# Patient Record
Sex: FEMALE | Race: WHITE | URBAN - METROPOLITAN AREA
[De-identification: names, ages, dates, MRNs, and addresses within clinical notes are randomized per-mention and may not be internally consistent; named-entity substitution may affect disease eponyms.]

---

## 2018-03-27 ENCOUNTER — HISTORICAL (OUTPATIENT)
Dept: ADMINISTRATIVE | Facility: HOSPITAL | Age: 63
End: 2018-03-27

## 2018-04-02 LAB
INFLUENZA A ANTIGEN, POC: NEGATIVE
INFLUENZA B ANTIGEN, POC: NEGATIVE

## 2022-04-12 ENCOUNTER — HISTORICAL (OUTPATIENT)
Dept: ADMINISTRATIVE | Facility: HOSPITAL | Age: 67
End: 2022-04-12

## 2022-04-30 VITALS
DIASTOLIC BLOOD PRESSURE: 76 MMHG | BODY MASS INDEX: 23.49 KG/M2 | OXYGEN SATURATION: 98 % | SYSTOLIC BLOOD PRESSURE: 152 MMHG | HEIGHT: 67 IN | WEIGHT: 149.69 LBS

## 2022-05-05 NOTE — HISTORICAL [facility] CERNER
This is a historical note converted from [place]. Formatting and pictures may have been removed.  Please reference [place] for original formatting and attached multimedia. Chief Complaint  left wrist and left knee. no injury, wrist pain for about a month and knee pain for a couple of months  History of Present Illness  This 62-year-old comes in complaining of left wrist pain.? I performed open reduction and internal fixation of her left distal radius in 2013.? She was doing well until she started new exercises.? She hit the base of her wrist and has pain since that time.? She is also complaining of some weakness in her wrist and hand.? She is also complaining of left knee pain.? She is accompanied by her .  Review of Systems  Constitutional: No fever, weakness, or fatigue.  Ear/Nose/Mouth/Throat: No nasal congestion or sore throat.  Respiratory: No shortness of breath or cough.  Cardiovascular: No chest pain, palpitations, or peripheral edema.  Gastrointestinal: No nausea, vomiting, or abdominal pain.  Genitourinary: No dysuria.  Musculoskeletal: See current complaints  Integumentary: Negative.  Physical Exam  Vitals & Measurements  HR:?56(Peripheral)? BP:?145/76?  HT:?168.99?cm? HT:?168.99?cm? WT:?69.39?kg? WT:?69.39?kg? BMI:?24.3?  Physical examination of the left wrist shows that her wrist healed uneventfully.? Dorsiflexion is 75? and palmar flexion is 85?.? Supination is 90? and pronation is 90?.? She is thin and has palpable hardware at her distal radius.? Examination of her thumb shows that she has mild CMC arthritis.? She is also noted to have extensive intrinsic atrophy with weakness in key pinch on the left.? She has mild intrinsic atrophy on the right.? She has decreased  strength.? She has a positive Tinels on the mid forearm to the wrist on the left, in the ulnar nerve distribution.  ?  Examination of her left knee shows that the patient has quadriceps insertional tendinitis.? Range of motion is  0-120?. ?She has good patella tracking and no evidence of ligamentous laxity.? She has no intra-articular effusion.  ?  AP lateral and oblique of the left wrist shows the patients fracture healed anatomically and her hardware position is unchanged.  Assessment/Plan  1.?Left wrist pain  ? The findings were reviewed with the patient and she expressed understanding.? The patient?is considering hardware removal.? We also discussed getting nerve conduction studies to rule out ulnar nerve entrapment.? As far as her knee is concerned she has done well with topical anti-inflammatories and stretching.? She states that she will call if she needs anything further.? She requested no pain medication.  Ordered:  Office/Outpatient Visit Level 3 Established [id number] PC, Left wrist pain  Lesion of left ulnar nerve  Strain of left quadriceps tendon  Painful orthopaedic hardware, MD AMB - [facility], 03/27/18 11:28:00 CDT  XR Wrist Left Minimum 3 Views, Routine, 03/27/18 8:36:00 CDT, Pain, None, Stretcher, Patient Has IV?, Rad Type, Left wrist pain, Not Scheduled, 03/27/18 8:36:00 CDT  ?  2.?Lesion of left ulnar nerve  Ordered:  Office/Outpatient Visit Level 3 Established [id number] PC, Left wrist pain  Lesion of left ulnar nerve  Strain of left quadriceps tendon  Painful orthopaedic hardware, [facility] - [facility], 03/27/18 11:28:00 CDT  ?  3.?Strain of left quadriceps tendon  Ordered:  Office/Outpatient Visit Level 3 Established [id number] PC, Left wrist pain  Lesion of left ulnar nerve  Strain of left quadriceps tendon  Painful orthopaedic hardware, [facility] AM[facility] - [facility], 03/27/18 11:28:00 CDT  ?  4.?Painful orthopaedic hardware  Ordered:  Office/Outpatient Visit Level 3 Established [id number] PC, Left wrist pain  Lesion of left ulnar nerve  Strain of left quadriceps tendon  Painful orthopaedic hardware, [facility] - [facility], 03/27/18 11:28:00 CDT  ?  Orders:  Clinic Follow-up PRN, 03/27/18 11:28:00 CDT, Future Order, [facility]  [place]   Problem List/Past Medical History  Ongoing  Left wrist pain  Lesion of left ulnar nerve  Painful orthopaedic hardware  Strain of left quadriceps tendon  Historical  can lie flat  Claustrophobia  Colon polyp  fx left radius left rib  numbness/tingling buttocks d/t fall  Wears glasses  Procedure/Surgical History  Open reduction of fracture of radius and ulna with internal fixation (10/10/2013), Open treatment of distal radial extra-articular fracture or epiphyseal separation, with internal fixation. (10/10/2013), ORIF Radius (Left) (10/10/2013), Appendectomy, Arthroscope, Arthroscope, Colonoscopy, rt ankle ganglion cyst reml, Tonsillectomy and adenoidectomy, uterine ablation 2003.  Medications  Vitamin C 500 mg oral capsule, 500 mg= 1 cap(s), Oral, Daily  Vitamin D3 1000 intl units oral capsule, 1000 IntUnit= 1 cap(s), Oral, Daily  Vitamin K 5 mg Oral Tab, 2 tab(s), Oral, Once  Allergies  Demerol?(rash)  Valium?(pt reports  eyes opened big and contacts came out, fetal position with legs straight, head went back)  morphine?(rash)  numbing med used at dentist?(chest tightness, someone sitting on chest)  Social History  Alcohol - Denies Alcohol Use, 10/09/2013  Current, 10/10/2013  Employment/School  Retired, Highest education level: Post graduate degree(s)., 10/09/2013  Substance Abuse - Denies Substance Abuse, 10/09/2013  Tobacco  Past, Cigarettes, 10/09/2013

## 2022-09-22 ENCOUNTER — HISTORICAL (OUTPATIENT)
Dept: ADMINISTRATIVE | Facility: HOSPITAL | Age: 67
End: 2022-09-22

## 2024-06-13 ENCOUNTER — CLINICAL SUPPORT (OUTPATIENT)
Dept: RADIATION THERAPY | Facility: HOSPITAL | Age: 69
End: 2024-06-13
Attending: RADIOLOGY
Payer: MEDICARE

## 2024-06-13 PROCEDURE — 77386 HC IMRT, COMPLEX: CPT | Performed by: RADIOLOGY

## 2024-06-14 PROCEDURE — 77386 HC IMRT, COMPLEX: CPT | Performed by: RADIOLOGY

## 2024-06-17 PROCEDURE — 77336 RADIATION PHYSICS CONSULT: CPT | Performed by: RADIOLOGY

## 2024-06-17 PROCEDURE — 77386 HC IMRT, COMPLEX: CPT | Performed by: RADIOLOGY

## 2024-06-18 PROCEDURE — 77386 HC IMRT, COMPLEX: CPT | Performed by: RADIOLOGY

## 2024-06-19 PROCEDURE — 77386 HC IMRT, COMPLEX: CPT | Performed by: RADIOLOGY

## 2024-06-20 PROCEDURE — 77386 HC IMRT, COMPLEX: CPT | Performed by: RADIOLOGY

## 2024-06-21 PROCEDURE — 77386 HC IMRT, COMPLEX: CPT | Performed by: RADIOLOGY

## 2024-06-24 PROCEDURE — 77386 HC IMRT, COMPLEX: CPT | Performed by: RADIOLOGY

## 2024-06-24 PROCEDURE — 77336 RADIATION PHYSICS CONSULT: CPT | Performed by: RADIOLOGY

## 2024-06-25 PROCEDURE — 77386 HC IMRT, COMPLEX: CPT | Performed by: RADIOLOGY

## 2024-06-26 PROCEDURE — 77386 HC IMRT, COMPLEX: CPT | Performed by: RADIOLOGY

## 2024-06-27 PROCEDURE — 77386 HC IMRT, COMPLEX: CPT | Performed by: RADIOLOGY

## 2024-06-28 PROCEDURE — 77386 HC IMRT, COMPLEX: CPT | Performed by: RADIOLOGY

## 2024-07-01 ENCOUNTER — CLINICAL SUPPORT (OUTPATIENT)
Dept: RADIATION THERAPY | Facility: HOSPITAL | Age: 69
End: 2024-07-01
Attending: RADIOLOGY
Payer: MEDICARE

## 2024-07-01 PROCEDURE — 77336 RADIATION PHYSICS CONSULT: CPT | Performed by: RADIOLOGY

## 2024-07-01 PROCEDURE — 77386 HC IMRT, COMPLEX: CPT | Performed by: RADIOLOGY

## 2024-07-02 PROCEDURE — 77386 HC IMRT, COMPLEX: CPT | Performed by: RADIOLOGY

## 2024-07-03 PROCEDURE — 77386 HC IMRT, COMPLEX: CPT | Performed by: RADIOLOGY

## 2024-07-05 PROCEDURE — 77386 HC IMRT, COMPLEX: CPT | Performed by: RADIOLOGY

## 2024-07-08 PROCEDURE — 77336 RADIATION PHYSICS CONSULT: CPT | Performed by: RADIOLOGY

## 2024-07-08 PROCEDURE — 77386 HC IMRT, COMPLEX: CPT | Performed by: RADIOLOGY

## 2024-07-09 PROCEDURE — 77386 HC IMRT, COMPLEX: CPT | Performed by: RADIOLOGY

## 2024-07-10 PROCEDURE — 77386 HC IMRT, COMPLEX: CPT | Performed by: RADIOLOGY

## 2024-07-11 PROCEDURE — 77386 HC IMRT, COMPLEX: CPT | Performed by: RADIOLOGY

## 2024-07-12 PROCEDURE — 77386 HC IMRT, COMPLEX: CPT | Performed by: RADIOLOGY

## 2024-07-15 PROCEDURE — 77386 HC IMRT, COMPLEX: CPT | Performed by: RADIOLOGY

## 2024-07-15 PROCEDURE — 77336 RADIATION PHYSICS CONSULT: CPT | Performed by: RADIOLOGY

## 2024-07-16 PROCEDURE — 77386 HC IMRT, COMPLEX: CPT | Performed by: RADIOLOGY

## 2024-07-16 PROCEDURE — 77338 DESIGN MLC DEVICE FOR IMRT: CPT | Performed by: RADIOLOGY

## 2024-07-16 PROCEDURE — 77301 RADIOTHERAPY DOSE PLAN IMRT: CPT | Mod: 59 | Performed by: RADIOLOGY

## 2024-07-16 PROCEDURE — 77300 RADIATION THERAPY DOSE PLAN: CPT | Performed by: RADIOLOGY

## 2024-07-17 PROCEDURE — 77386 HC IMRT, COMPLEX: CPT | Performed by: RADIOLOGY

## 2024-07-18 PROCEDURE — 77386 HC IMRT, COMPLEX: CPT | Performed by: RADIOLOGY

## 2024-07-19 PROCEDURE — 77386 HC IMRT, COMPLEX: CPT | Performed by: RADIOLOGY

## 2024-07-22 PROCEDURE — 77386 HC IMRT, COMPLEX: CPT | Performed by: RADIOLOGY

## 2024-07-22 PROCEDURE — 77336 RADIATION PHYSICS CONSULT: CPT

## 2024-07-23 PROCEDURE — 77386 HC IMRT, COMPLEX: CPT | Performed by: RADIOLOGY

## 2024-07-24 PROCEDURE — 77386 HC IMRT, COMPLEX: CPT | Performed by: RADIOLOGY

## 2024-07-25 PROCEDURE — 77386 HC IMRT, COMPLEX: CPT | Performed by: RADIOLOGY

## 2024-07-26 PROCEDURE — 77386 HC IMRT, COMPLEX: CPT | Performed by: RADIOLOGY

## 2024-07-26 PROCEDURE — 77336 RADIATION PHYSICS CONSULT: CPT | Performed by: RADIOLOGY

## 2024-07-29 PROCEDURE — 77336 RADIATION PHYSICS CONSULT: CPT | Performed by: RADIOLOGY
